# Patient Record
Sex: FEMALE | Race: BLACK OR AFRICAN AMERICAN | Employment: FULL TIME | URBAN - METROPOLITAN AREA
[De-identification: names, ages, dates, MRNs, and addresses within clinical notes are randomized per-mention and may not be internally consistent; named-entity substitution may affect disease eponyms.]

---

## 2018-10-18 ENCOUNTER — OFFICE VISIT (OUTPATIENT)
Dept: FAMILY MEDICINE CLINIC | Age: 58
End: 2018-10-18

## 2018-10-18 VITALS
BODY MASS INDEX: 26.51 KG/M2 | HEIGHT: 69 IN | HEART RATE: 75 BPM | RESPIRATION RATE: 20 BRPM | SYSTOLIC BLOOD PRESSURE: 127 MMHG | WEIGHT: 179 LBS | DIASTOLIC BLOOD PRESSURE: 73 MMHG | OXYGEN SATURATION: 99 % | TEMPERATURE: 97.4 F

## 2018-10-18 DIAGNOSIS — Z13.1 SCREENING FOR DIABETES MELLITUS (DM): ICD-10-CM

## 2018-10-18 DIAGNOSIS — Z00.00 ENCOUNTER FOR ANNUAL PHYSICAL EXAM: Primary | ICD-10-CM

## 2018-10-18 DIAGNOSIS — Z71.89 ADVANCE DIRECTIVE DISCUSSED WITH PATIENT: ICD-10-CM

## 2018-10-18 DIAGNOSIS — Z85.118 H/O: LUNG CANCER: ICD-10-CM

## 2018-10-18 DIAGNOSIS — Z12.31 BREAST CANCER SCREENING BY MAMMOGRAM: ICD-10-CM

## 2018-10-18 DIAGNOSIS — E55.9 VITAMIN D DEFICIENCY: ICD-10-CM

## 2018-10-18 DIAGNOSIS — M81.0 POSTMENOPAUSAL OSTEOPOROSIS: ICD-10-CM

## 2018-10-18 DIAGNOSIS — E78.5 DYSLIPIDEMIA (HIGH LDL; LOW HDL): ICD-10-CM

## 2018-10-18 DIAGNOSIS — Z11.59 NEED FOR HEPATITIS C SCREENING TEST: ICD-10-CM

## 2018-10-18 DIAGNOSIS — Z13.29 SCREENING FOR THYROID DISORDER: ICD-10-CM

## 2018-10-18 RX ORDER — LETROZOLE 2.5 MG/1
2.5 TABLET, FILM COATED ORAL DAILY
COMMUNITY

## 2018-10-18 NOTE — PATIENT INSTRUCTIONS

## 2018-10-18 NOTE — PROGRESS NOTES
Chief Complaint   Patient presents with    New Patient    Breast Problem     Subjective: Jorge Bal is a 62 y.o. female with h/o lung cancer presents to [facility] care for annual medical exam.   Her gyne and breast care is done elsewhere by her Ob-Gyne physician. Current Outpatient Medications   Medication Sig Dispense Refill    letrozole (FEMARA) 2.5 mg tablet Take 2.5 mg by mouth daily. No Known Allergies       Past Surgical History:   Procedure Laterality Date    HX MYOMECTOMY  2004    HX OTHER SURGICAL  2016    lung surgery      Family History   Problem Relation Age of Onset    Heart Disease Mother     Hypertension Mother      Social History     Tobacco Use    Smoking status: Never Smoker    Smokeless tobacco: Never Used   Substance Use Topics    Alcohol use: No     Frequency: Never      ROS:   Feeling generally well  No unusual headaches, no dysphagia  No prolonged cough. No dyspnea or chest pain on exertion  No abdominal pain, change in bowel habits, black or bloody stools  No urinary tract symptoms  No new or unusual musculoskeletal symptoms. Objective:  Blood pressure 127/73, pulse 75, temperature 97.4 °F (36.3 °C), resp. rate 20, height 5' 9\" (1.753 m), weight 179 lb (81.2 kg), SpO2 99 %. Patient appears well, alert, oriented x 3, in no distress. ENT normal.  Neck supple. No adenopathy or thyromegaly. PERRL. Lungs: clear, good air entry, no wheezes, rhonchi or rales  CVS: S1 and S2 normal, no murmurs, regular rate and rhythm  Abdomen soft without tenderness, guarding, mass or organomegaly  Ext: no edema, normal peripheral pulses  Neuro: normal, no focal findings    Breast and Pelvic exams are deferred.     Assessment/Plan:  Diagnoses and all orders for this visit:    Encounter for annual physical exam  -     CBC WITH AUTOMATED DIFF  -     METABOLIC PANEL, COMPREHENSIVE  -     URINALYSIS W/ RFLX MICROSCOPIC    Advance directive discussed with patient  -     ME ADVANCE CARE PLANNING DISCUSS DOCUMENTED IN MEDICAL RECORD    Dyslipidemia (high LDL; low HDL)  -     LIPID PANEL    Vitamin D deficiency  -     VITAMIN D, 25 HYDROXY    Screening for diabetes mellitus (DM)  -     HEMOGLOBIN A1C WITH EAG    Screening for thyroid disorder  -     TSH 3RD GENERATION    Postmenopausal osteoporosis  -     DEXA BONE DENSITY STUDY AXIAL; Future    Breast cancer screening by mammogram  -     [facility] MAMMO BI SCREENING INCL CAD; Future    H/O: lung cancer    Need for hepatitis C screening test  -     HEPATITIS C AB      Patient Instructions        Advance Directives: Care Instructions  Your Care Instructions  An advance directive is a legal way to state your wishes at the end of your life. It tells your family and your doctor what to do if you can no longer say what you want. There are two main types of advance directives. You can change them any time that your wishes change. · A living will tells your family and your doctor your wishes about life support and other treatment. · A durable power of  for health care lets you name a person to make treatment decisions for you when you can't speak for yourself. This person is called a health care agent. If you do not have an advance directive, decisions about your medical care may be made by a doctor or a  who doesn't know you. It may help to think of an advance directive as a gift to the people who care for you. If you have one, they won't have to make tough decisions by themselves. Follow-up care is a key part of your treatment and safety. Be sure to make and go to all appointments, and call your doctor if you are having problems. It's also a good idea to know your test results and keep a list of the medicines you take. How can you care for yourself at home? · Discuss your wishes with your loved ones and your doctor. This way, there are no surprises. · Many states have a unique form.  Or you might use a universal form that has been approved by many states. This kind of form can sometimes be completed and stored online. Your electronic copy will then be available wherever you have a connection to the Internet. In most cases, doctors will respect your wishes even if you have a form from a different state. · You don't need a  to do an advance directive. But you may want to get legal advice. · Think about these questions when you prepare an advance directive:  ? Who do you want to make decisions about your medical care if you are not able to? Many people choose a family member or close friend. ? Do you know enough about life support methods that might be used? If not, talk to your doctor so you understand. ? What are you most afraid of that might happen? You might be afraid of having pain, losing your independence, or being kept alive by machines. ? Where would you prefer to die? Choices include your home, a hospital, or a nursing home. ? Would you like to have information about hospice care to support you and your family? ? Do you want to donate organs when you die? ? Do you want certain [employer]ist practices performed before you die? If so, put your wishes in the advance directive. · Read your advance directive every year, and make changes as needed. When should you call for help? Be sure to contact your doctor if you have any questions. Where can you learn more? Go to http://kirti-jazmyn.info/. Enter R264 in the search box to learn more about \"Advance Directives: Care Instructions. \"  Current as of: April 19, 2018  Content Version: 11.8  © 3045-5580 Healthwise, Incorporated. Care instructions adapted under license by [employer] (which disclaims liability or warranty for this information). If you have questions about a medical condition or this instruction, always ask your healthcare professional. [address] any warranty or liability for your use of this information.         Follow-up Disposition:  Return 1-2 weeks , for follow up results.

## 2018-10-18 NOTE — LETTER
11/1/2018 10:09 AM 
 
Ms. Adrienne Perez [facility] [address] Dear Adrienne ePrez: Please find your most recent results below. Tot chol and LDL are trending, vit D is low normal, A1C ist a borderline diabetes level. Kidney and liver are normal, thhyroid function is normal 
 
Resulted Orders CBC WITH AUTOMATED DIFF Result Value Ref Range WBC 3.9 3.4 - 10.8 x10E3/uL  
 RBC 4.78 3.77 - 5.28 x10E6/uL HGB 12.5 11.1 - 15.9 g/dL HCT 38.9 34.0 - 46.6 % MCV 81 79 - 97 fL  
 MCH 26.2 (L) 26.6 - 33.0 pg  
 MCHC 32.1 31.5 - 35.7 g/dL  
 RDW 14.3 12.3 - 15.4 % PLATELET 684 543 - 740 x10E3/uL NEUTROPHILS 66 Not Estab. % Lymphocytes 19 Not Estab. % MONOCYTES 10 Not Estab. % EOSINOPHILS 4 Not Estab. % BASOPHILS 1 Not Estab. %  
 ABS. NEUTROPHILS 2.6 1.4 - 7.0 x10E3/uL Abs Lymphocytes 0.7 0.7 - 3.1 x10E3/uL  
 ABS. MONOCYTES 0.4 0.1 - 0.9 x10E3/uL  
 ABS. EOSINOPHILS 0.2 0.0 - 0.4 x10E3/uL  
 ABS. BASOPHILS 0.0 0.0 - 0.2 x10E3/uL IMMATURE GRANULOCYTES 0 Not Estab. %  
 ABS. IMM. GRANS. 0.0 0.0 - 0.1 x10E3/uL Narrative Performed at:  [address]  [id number] : Elaine Gresham MD, Phone:  [phone] METABOLIC PANEL, COMPREHENSIVE Result Value Ref Range Glucose 96 65 - 99 mg/dL BUN 11 6 - 24 mg/dL Creatinine 0.93 0.57 - 1.00 mg/dL GFR est non-AA 68 >59 mL/min/1.73 GFR est AA 79 >59 mL/min/1.73  
 BUN/Creatinine ratio 12 9 - 23 Sodium 142 134 - 144 mmol/L Potassium 4.6 3.5 - 5.2 mmol/L Chloride 103 96 - 106 mmol/L  
 CO2 23 20 - 29 mmol/L Calcium 10.2 8.7 - 10.2 mg/dL Protein, total 7.5 6.0 - 8.5 g/dL Albumin 4.6 3.5 - 5.5 g/dL GLOBULIN, TOTAL 2.9 1.5 - 4.5 g/dL A-G Ratio 1.6 1.2 - 2.2 Bilirubin, total 0.3 0.0 - 1.2 mg/dL Alk. phosphatase 88 39 - 117 IU/L  
 AST (SGOT) 23 0 - 40 IU/L  
 ALT (SGPT) 20 0 - 32 IU/L Narrative Performed at:  [address]  [id number] : Ila Tejeda MD, Phone:  [phone] TSH 3RD GENERATION Result Value Ref Range TSH 1.670 0.450 - 4.500 uIU/mL Narrative Performed at:  [address]  [id number] : Ila Tejeda MD, Phone:  [phone] URINALYSIS W/ RFLX MICROSCOPIC Result Value Ref Range Specific Gravity 1.024 1.005 - 1.030  
 pH (UA) 6.0 5.0 - 7.5 Color Yellow Yellow Appearance Clear Clear Leukocyte Esterase Negative Negative Protein Negative Negative/Trace Glucose Negative Negative Ketone Negative Negative Blood Negative Negative Bilirubin Negative Negative Urobilinogen 0.2 0.2 - 1.0 mg/dL Nitrites Negative Negative Microscopic Examination Comment Comment:  
   Microscopic not indicated and not performed. Narrative Performed at:  [address]  [id number] : Ila Tejeda MD, Phone:  [phone] VITAMIN D, 25 HYDROXY Result Value Ref Range VITAMIN D, 25-HYDROXY 34.4 30.0 - 100.0 ng/mL Comment:  
   Vitamin D deficiency has been defined by the [address] [place] [address] practice guideline as a 
level of serum 25-OH vitamin D less than 20 ng/mL (1,2). The Endocrine Society went on to further define vitamin D 
insufficiency as a level between 21 and 29 ng/mL (2). 1. IOM ([place] of Medicine). 2010. Dietary reference 
   intakes for calcium and D. [facility]: The 
   [employer]formerly [employer]). 2. Abigail MF, Susi NC, Alex WONG, et al. 
   Evaluation, treatment, and prevention of vitamin D 
   deficiency: an Endocrine Society clinical practice 
   guideline. JCEM. 2011 Jul; 96(7):1911-30. Narrative Performed at:  [address]  [id number] : Myrtle Arambula MD, Phone:  [phone] LIPID PANEL Result Value Ref Range Cholesterol, total 219 (H) 100 - 199 mg/dL Triglyceride 137 0 - 149 mg/dL HDL Cholesterol 44 >39 mg/dL VLDL, calculated 27 5 - 40 mg/dL LDL, calculated 148 (H) 0 - 99 mg/dL Narrative Performed at:  [address]  [id number] : Myrtle Arambula MD, Phone:  [phone] HEMOGLOBIN A1C WITH EAG Result Value Ref Range Hemoglobin A1c 6.1 (H) 4.8 - 5.6 % Comment:  
            Prediabetes: 5.7 - 6.4 Diabetes: >6.4 Glycemic control for adults with diabetes: <7.0 Estimated average glucose 128 mg/dL Narrative Performed at:  [address]  [id number] : Myrtle Arambula MD, Phone:  [phone] HEPATITIS C AB Result Value Ref Range Hep C Virus Ab <0.1 0.0 - 0.9 s/co ratio Comment:  
                                     Negative:     < 0.8 Indeterminate: 0.8 - 0.9 Positive:     > 0.9 The CDC recommends that a positive HCV antibody result 
 be followed up with a HCV Nucleic Acid Amplification 
 test ([id number]). Narrative Performed at:  [address]  [id number] : Myrtle Arambula MD, Phone:  [phone] RECOMMENDATIONS: 
Work on diet and exercise. Make sure you are taking 500mg of Calcium with Vitamin D twice a day. Please call me if you have any questions: [phone] Sincerely, Ricardo Kenny MD

## 2018-10-23 LAB
25(OH)D3+25(OH)D2 SERPL-MCNC: 34.4 NG/ML (ref 30–100)
ALBUMIN SERPL-MCNC: 4.6 G/DL (ref 3.5–5.5)
ALBUMIN/GLOB SERPL: 1.6 {RATIO} (ref 1.2–2.2)
ALP SERPL-CCNC: 88 IU/L (ref 39–117)
ALT SERPL-CCNC: 20 IU/L (ref 0–32)
APPEARANCE UR: CLEAR
AST SERPL-CCNC: 23 IU/L (ref 0–40)
BASOPHILS # BLD AUTO: 0 X10E3/UL (ref 0–0.2)
BASOPHILS NFR BLD AUTO: 1 %
BILIRUB SERPL-MCNC: 0.3 MG/DL (ref 0–1.2)
BILIRUB UR QL STRIP: NEGATIVE
BUN SERPL-MCNC: 11 MG/DL (ref 6–24)
BUN/CREAT SERPL: 12 (ref 9–23)
CALCIUM SERPL-MCNC: 10.2 MG/DL (ref 8.7–10.2)
CHLORIDE SERPL-SCNC: 103 MMOL/L (ref 96–106)
CHOLEST SERPL-MCNC: 219 MG/DL (ref 100–199)
CO2 SERPL-SCNC: 23 MMOL/L (ref 20–29)
COLOR UR: YELLOW
CREAT SERPL-MCNC: 0.93 MG/DL (ref 0.57–1)
EOSINOPHIL # BLD AUTO: 0.2 X10E3/UL (ref 0–0.4)
EOSINOPHIL NFR BLD AUTO: 4 %
ERYTHROCYTE [DISTWIDTH] IN BLOOD BY AUTOMATED COUNT: 14.3 % (ref 12.3–15.4)
EST. AVERAGE GLUCOSE BLD GHB EST-MCNC: 128 MG/DL
GLOBULIN SER CALC-MCNC: 2.9 G/DL (ref 1.5–4.5)
GLUCOSE SERPL-MCNC: 96 MG/DL (ref 65–99)
GLUCOSE UR QL: NEGATIVE
HBA1C MFR BLD: 6.1 % (ref 4.8–5.6)
HCT VFR BLD AUTO: 38.9 % (ref 34–46.6)
HCV AB S/CO SERPL IA: <0.1 S/CO RATIO (ref 0–0.9)
HDLC SERPL-MCNC: 44 MG/DL
HGB BLD-MCNC: 12.5 G/DL (ref 11.1–15.9)
HGB UR QL STRIP: NEGATIVE
IMM GRANULOCYTES # BLD: 0 X10E3/UL (ref 0–0.1)
IMM GRANULOCYTES NFR BLD: 0 %
KETONES UR QL STRIP: NEGATIVE
LDLC SERPL CALC-MCNC: 148 MG/DL (ref 0–99)
LEUKOCYTE ESTERASE UR QL STRIP: NEGATIVE
LYMPHOCYTES # BLD AUTO: 0.7 X10E3/UL (ref 0.7–3.1)
LYMPHOCYTES NFR BLD AUTO: 19 %
MCH RBC QN AUTO: 26.2 PG (ref 26.6–33)
MCHC RBC AUTO-ENTMCNC: 32.1 G/DL (ref 31.5–35.7)
MCV RBC AUTO: 81 FL (ref 79–97)
MICRO URNS: NORMAL
MONOCYTES # BLD AUTO: 0.4 X10E3/UL (ref 0.1–0.9)
MONOCYTES NFR BLD AUTO: 10 %
NEUTROPHILS # BLD AUTO: 2.6 X10E3/UL (ref 1.4–7)
NEUTROPHILS NFR BLD AUTO: 66 %
NITRITE UR QL STRIP: NEGATIVE
PH UR STRIP: 6 [PH] (ref 5–7.5)
PLATELET # BLD AUTO: 235 X10E3/UL (ref 150–379)
POTASSIUM SERPL-SCNC: 4.6 MMOL/L (ref 3.5–5.2)
PROT SERPL-MCNC: 7.5 G/DL (ref 6–8.5)
PROT UR QL STRIP: NEGATIVE
RBC # BLD AUTO: 4.78 X10E6/UL (ref 3.77–5.28)
SODIUM SERPL-SCNC: 142 MMOL/L (ref 134–144)
SP GR UR: 1.02 (ref 1–1.03)
TRIGL SERPL-MCNC: 137 MG/DL (ref 0–149)
TSH SERPL DL<=0.005 MIU/L-ACNC: 1.67 UIU/ML (ref 0.45–4.5)
UROBILINOGEN UR STRIP-MCNC: 0.2 MG/DL (ref 0.2–1)
VLDLC SERPL CALC-MCNC: 27 MG/DL (ref 5–40)
WBC # BLD AUTO: 3.9 X10E3/UL (ref 3.4–10.8)

## 2018-11-01 ENCOUNTER — OFFICE VISIT (OUTPATIENT)
Dept: FAMILY MEDICINE CLINIC | Age: 58
End: 2018-11-01

## 2018-11-01 VITALS
BODY MASS INDEX: 26.51 KG/M2 | HEIGHT: 69 IN | HEART RATE: 80 BPM | DIASTOLIC BLOOD PRESSURE: 78 MMHG | WEIGHT: 179 LBS | SYSTOLIC BLOOD PRESSURE: 114 MMHG | TEMPERATURE: 98.3 F | OXYGEN SATURATION: 98 % | RESPIRATION RATE: 20 BRPM

## 2018-11-01 DIAGNOSIS — J20.9 ACUTE BRONCHITIS DUE TO INFECTION: Primary | ICD-10-CM

## 2018-11-01 DIAGNOSIS — Z12.11 COLON CANCER SCREENING: ICD-10-CM

## 2018-11-01 DIAGNOSIS — E78.5 DYSLIPIDEMIA (HIGH LDL; LOW HDL): ICD-10-CM

## 2018-11-01 DIAGNOSIS — R73.03 BORDERLINE DIABETES MELLITUS: ICD-10-CM

## 2018-11-01 DIAGNOSIS — E55.9 HYPOVITAMINOSIS D: ICD-10-CM

## 2018-11-01 RX ORDER — AZITHROMYCIN 250 MG/1
TABLET, FILM COATED ORAL
Qty: 6 TAB | Refills: 0 | Status: SHIPPED | OUTPATIENT
Start: 2018-11-01 | End: 2018-11-06

## 2018-11-01 RX ORDER — PROMETHAZINE HYDROCHLORIDE AND DEXTROMETHORPHAN HYDROBROMIDE 6.25; 15 MG/5ML; MG/5ML
5 SYRUP ORAL
Qty: 118 ML | Refills: 0 | Status: SHIPPED | OUTPATIENT
Start: 2018-11-01 | End: 2018-11-08

## 2018-11-01 RX ORDER — CHOLECALCIFEROL TAB 125 MCG (5000 UNIT) 125 MCG
5000 TAB ORAL DAILY
Qty: 90 TAB | Refills: 1 | Status: SHIPPED | OUTPATIENT
Start: 2018-11-01

## 2018-11-01 NOTE — PATIENT INSTRUCTIONS
Learning About Vitamin D  Why is it important to get enough vitamin D? Your body needs vitamin D to absorb calcium. Calcium keeps your bones and muscles, including your heart, healthy and strong. If your muscles don't get enough calcium, they can cramp, hurt, or feel weak. You may have long-term (chronic) muscle aches and pains. If you don't get enough vitamin D throughout life, you have an increased chance of having thin and brittle bones (osteoporosis) in your later years. Children who don't get enough vitamin D may not grow as much as others their age. They also have a chance of getting a rare disease called rickets. It causes weak bones. Vitamin D and calcium are added to many foods. And your body uses sunshine to make its own vitamin D. How much vitamin D do you need? The [place] of Medicine recommends that people ages 3 through 79 get 600 IU (international units) every day. Adults 71 and older need 800 IU every day. Blood tests for vitamin D can check your vitamin D level. But there is no standard normal range used by all laboratories. You're likely getting enough vitamin D if your levels are in the range of 20 to 50 ng/mL. How can you get more vitamin D? Foods that contain vitamin D include:  · [place], tuna, and mackerel. These are some of the best foods to eat when you need to get more vitamin D.  · Cheese, egg yolks, and beef liver. These foods have vitamin D in small amounts. · Milk, soy drinks, orange juice, yogurt, margarine, and some kinds of cereal have vitamin D added to them. Some people don't make vitamin D as well as others. They may have to take extra care in getting enough vitamin D. Things that reduce how much vitamin D your body makes include:  · Dark skin, such as many  Americans have. · Age, especially if you are older than 72. · Digestive problems, such as Crohn's or celiac disease. · Liver and kidney disease.   Some people who do not get enough vitamin D may need supplements. Are there any risks from taking vitamin D?  · Too much vitamin D:  ? Can damage your kidneys. ? Can cause nausea and vomiting, constipation, and weakness. ? Raises the amount of calcium in your blood. If this happens, you can get confused or have an irregular heart rhythm. · Vitamin D may interact with other medicines. Tell your doctor about all of the medicines you take, including over-the-counter drugs, herbs, and pills. Tell your doctor about all of your current medical problems. Where can you learn more? Go to http://kirti-jazmyn.info/. Enter [id number] in the search box to learn more about \"Learning About Vitamin D.\"  Current as of: March 29, 2018  Content Version: 11.8  © 0545-2072 Healthwise, Incorporated. Care instructions adapted under license by [employer] (which disclaims liability or warranty for this information). If you have questions about a medical condition or this instruction, always ask your healthcare professional. [place] [address] any warranty or liability for your use of this information.

## 2018-11-05 ENCOUNTER — HOSPITAL ENCOUNTER (OUTPATIENT)
Dept: MAMMOGRAPHY | Age: 58
Discharge: HOME | End: 2018-11-05
Attending: INTERNAL MEDICINE
Payer: COMMERCIAL

## 2018-11-05 DIAGNOSIS — Z12.31 BREAST CANCER SCREENING BY MAMMOGRAM: ICD-10-CM

## 2018-11-05 DIAGNOSIS — M81.0 POSTMENOPAUSAL OSTEOPOROSIS: ICD-10-CM

## 2018-11-05 PROCEDURE — 77067 SCR MAMMO BI INCL CAD: CPT

## 2018-11-05 PROCEDURE — 77080 DXA BONE DENSITY AXIAL: CPT

## 2018-11-05 NOTE — TELEPHONE ENCOUNTER
----- Message from Julius Pitts sent at 11/5/2018  1:40 PM EST -----  Regarding: Dr Hauser/telephone  Pt would like to speak to the doctor, regarding her medications, doctor forgot the directions, please call pt at [phone]. Pt would like call back today.

## 2018-11-12 ENCOUNTER — HOSPITAL ENCOUNTER (OUTPATIENT)
Dept: MAMMOGRAPHY | Age: 58
Discharge: HOME | End: 2018-11-12
Attending: INTERNAL MEDICINE
Payer: COMMERCIAL

## 2018-11-12 DIAGNOSIS — R92.8 ABNORMAL MAMMOGRAM: ICD-10-CM

## 2018-11-12 PROCEDURE — 76642 ULTRASOUND BREAST LIMITED: CPT

## 2018-11-12 PROCEDURE — 77065 DX MAMMO INCL CAD UNI: CPT

## 2018-12-28 ENCOUNTER — OFFICE VISIT (OUTPATIENT)
Dept: FAMILY MEDICINE CLINIC | Age: 58
End: 2018-12-28

## 2018-12-28 VITALS
WEIGHT: 174.2 LBS | BODY MASS INDEX: 25.8 KG/M2 | TEMPERATURE: 98.5 F | SYSTOLIC BLOOD PRESSURE: 122 MMHG | DIASTOLIC BLOOD PRESSURE: 87 MMHG | OXYGEN SATURATION: 97 % | HEIGHT: 69 IN | RESPIRATION RATE: 20 BRPM | HEART RATE: 105 BPM

## 2018-12-28 DIAGNOSIS — J45.909 ACUTE BRONCHITIS WITH ASTHMA: Primary | ICD-10-CM

## 2018-12-28 DIAGNOSIS — J20.9 ACUTE BRONCHITIS WITH ASTHMA: Primary | ICD-10-CM

## 2018-12-28 RX ORDER — IPRATROPIUM BROMIDE 0.5 MG/2.5ML
0.5 SOLUTION RESPIRATORY (INHALATION)
Qty: 2.5 ML | Refills: 0
Start: 2018-12-28 | End: 2018-12-28

## 2018-12-28 RX ORDER — ALBUTEROL SULFATE 90 UG/1
2 AEROSOL, METERED RESPIRATORY (INHALATION)
Qty: 1 INHALER | Refills: 1 | Status: SHIPPED | OUTPATIENT
Start: 2018-12-28

## 2018-12-28 RX ORDER — AZITHROMYCIN 250 MG/1
TABLET, FILM COATED ORAL
Qty: 6 TAB | Refills: 0 | Status: SHIPPED | OUTPATIENT
Start: 2018-12-28 | End: 2019-01-02

## 2018-12-28 RX ORDER — BENZONATATE 100 MG/1
100 CAPSULE ORAL
Qty: 21 CAP | Refills: 0 | Status: SHIPPED | OUTPATIENT
Start: 2018-12-28 | End: 2019-01-04

## 2018-12-28 NOTE — PROGRESS NOTES
Chief Complaint Patient presents with  Cold Symptoms  
  cough and congestion since Sunday. Patient is also wheezing 1. Have you been to the ER, urgent care clinic since your last visit? Hospitalized since your last visit?no 2. Have you seen or consulted any other health care providers outside of the [address] [place] [address] since your last visit? Include any pap smears or colon screening. No 
 
Patient having cough and congestion since Sunday. Patient also is wheezing.

## 2018-12-28 NOTE — PATIENT INSTRUCTIONS
Bronchitis: Care Instructions Your Care Instructions Bronchitis is inflammation of the bronchial tubes, which carry air to the lungs. The tubes swell and produce mucus, or phlegm. The mucus and inflamed bronchial tubes make you cough. You may have trouble breathing. Most cases of bronchitis are caused by viruses like those that cause colds. Antibiotics usually do not help and they may be harmful. Bronchitis usually develops rapidly and lasts about 2 to 3 weeks in otherwise healthy people. Follow-up care is a key part of your treatment and safety. Be sure to make and go to all appointments, and call your doctor if you are having problems. It's also a good idea to know your test results and keep a list of the medicines you take. How can you care for yourself at home? · Take all medicines exactly as prescribed. Call your doctor if you think you are having a problem with your medicine. · Get some extra rest. 
· Take an over-the-counter pain medicine, such as acetaminophen (Tylenol), ibuprofen (Advil, Motrin), or naproxen (Aleve) to reduce fever and relieve body aches. Read and follow all instructions on the label. · Do not take two or more pain medicines at the same time unless the doctor told you to. Many pain medicines have acetaminophen, which is Tylenol. Too much acetaminophen (Tylenol) can be harmful. · Take an over-the-counter cough medicine that contains dextromethorphan to help quiet a dry, hacking cough so that you can sleep. Avoid cough medicines that have more than one active ingredient. Read and follow all instructions on the label. · Breathe moist air from a humidifier, hot shower, or sink filled with hot water. The heat and moisture will thin mucus so you can cough it out. · Do not smoke. Smoking can make bronchitis worse. If you need help quitting, talk to your doctor about stop-smoking programs and medicines. These can increase your chances of quitting for good. When should you call for help? Call 911 anytime you think you may need emergency care. For example, call if: 
  · You have severe trouble breathing.  
 Call your doctor now or seek immediate medical care if: 
  · You have new or worse trouble breathing.  
  · You cough up dark brown or bloody mucus (sputum).  
  · You have a new or higher fever.  
  · You have a new rash.  
 Watch closely for changes in your health, and be sure to contact your doctor if: 
  · You cough more deeply or more often, especially if you notice more mucus or a change in the color of your mucus.  
  · You are not getting better as expected. Where can you learn more? Go to http://kirti-jazmyn.info/. Enter H333 in the search box to learn more about \"Bronchitis: Care Instructions. \" Current as of: December 6, 2017 Content Version: 11.8 © 6322-0205 Healthwise, [employer]. Care instructions adapted under license by [employer] (which disclaims liability or warranty for this information). If you have questions about a medical condition or this instruction, always ask your healthcare professional. [address] any warranty or liability for your use of this information.

## 2018-12-28 NOTE — PROGRESS NOTES
Chief Complaint Patient presents with  Cold Symptoms  
  cough and congestion since Sunday. Patient is also wheezing SUBJECTIVE:  
Tobi Blankenship is a 62 y.o. AA female who complains of chest congestion, cough described as productive of green sputum, wheezing for 7 days. She denies a history of fevers and shortness of breath. She does not have history of asthma. Patient does not smoke cigarettes. Positive h/o exposure. She has been self treating with otc medications with no improvement. OBJECTIVE: 
Blood pressure 122/87, pulse (!) 105, temperature 98.5 °F (36.9 °C), temperature source Oral, resp. rate 20, height 5' 9\" (1.753 m), weight 174 lb 3.2 oz (79 kg), SpO2 97 %. Vitals as noted above. Appearance: alert, well appearing, and in no distress and oriented to person, place, and time. ENT- bilateral TM fluid noted, neck has bilateral anterior cervical nodes enlarged and pharynx erythematous without exudate. Chest - bronchial breath sound, +ve wheezes, No rales or rhonchi ASSESSMENT/PLAN: 
Diagnoses and all orders for this visit: 
 
Acute bronchitis with asthma -     IPRATROPIUM BROMIDE NON-COMP 
-     ipratropium (ATROVENT) 0.02 % soln; 2.5 mL by Nebulization route now for 1 dose., No Print, Disp-2.5 mL, R-0 
-     UT PRESSURIZED/NONPRESSURIZED INHALATION TREATMENT 
-     azithromycin (ZITHROMAX) 250 mg tablet; use as directed, Normal, Disp-6 Tab, R-0 
-     benzonatate (TESSALON PERLES) 100 mg capsule; Take 1 Cap by mouth three (3) times daily as needed for Cough for up to 7 days. , Normal, Disp-21 Cap, R-0 
-     albuterol (PROVENTIL HFA, VENTOLIN HFA, PROAIR HFA) 90 mcg/actuation inhaler; Take 2 Puffs by inhalation every four (4) hours as needed for Wheezing., Normal, Disp-1 Inhaler, R-1 Patient Instructions Bronchitis: Care Instructions Your Care Instructions Bronchitis is inflammation of the bronchial tubes, which carry air to the lungs. The tubes swell and produce mucus, or phlegm. The mucus and inflamed bronchial tubes make you cough. You may have trouble breathing. Most cases of bronchitis are caused by viruses like those that cause colds. Antibiotics usually do not help and they may be harmful. Bronchitis usually develops rapidly and lasts about 2 to 3 weeks in otherwise healthy people. Follow-up care is a key part of your treatment and safety. Be sure to make and go to all appointments, and call your doctor if you are having problems. It's also a good idea to know your test results and keep a list of the medicines you take. How can you care for yourself at home? · Take all medicines exactly as prescribed. Call your doctor if you think you are having a problem with your medicine. · Get some extra rest. 
· Take an over-the-counter pain medicine, such as acetaminophen (Tylenol), ibuprofen (Advil, Motrin), or naproxen (Aleve) to reduce fever and relieve body aches. Read and follow all instructions on the label. · Do not take two or more pain medicines at the same time unless the doctor told you to. Many pain medicines have acetaminophen, which is Tylenol. Too much acetaminophen (Tylenol) can be harmful. · Take an over-the-counter cough medicine that contains dextromethorphan to help quiet a dry, hacking cough so that you can sleep. Avoid cough medicines that have more than one active ingredient. Read and follow all instructions on the label. · Breathe moist air from a humidifier, hot shower, or sink filled with hot water. The heat and moisture will thin mucus so you can cough it out. · Do not smoke. Smoking can make bronchitis worse. If you need help quitting, talk to your doctor about stop-smoking programs and medicines. These can increase your chances of quitting for good. When should you call for help? Call 911 anytime you think you may need emergency care. For example, call if: 
  · You have severe trouble breathing.  Call your doctor now or seek immediate medical care if: 
  · You have new or worse trouble breathing.  
  · You cough up dark brown or bloody mucus (sputum).  
  · You have a new or higher fever.  
  · You have a new rash.  
 Watch closely for changes in your health, and be sure to contact your doctor if: 
  · You cough more deeply or more often, especially if you notice more mucus or a change in the color of your mucus.  
  · You are not getting better as expected. Where can you learn more? Go to http://kirti-jazmyn.info/. Enter H333 in the search box to learn more about \"Bronchitis: Care Instructions. \" Current as of: December 6, 2017 Content Version: 11.8 © 0880-7683 [employer]. Care instructions adapted under license by [employer] (which disclaims liability or warranty for this information). If you have questions about a medical condition or this instruction, always ask your healthcare professional. [place] [address] any warranty or liability for your use of this information. Follow-up Disposition: 
Return if symptoms worsen or fail to improve, for f/u treatment.

## 2018-12-28 NOTE — LETTER
NOTIFICATION RETURN TO WORK  
 
12/28/2018 10:35 AM 
 
Ms. Rebekah Romero [facility] [address] To Whom It May Concern: Rebekah Romero is currently under the care of Kristen Epps. She will return to work January 2,2019. If there are questions or concerns please have the patient contact our office. Sincerely, Henry Yang MD

## 2020-04-07 ENCOUNTER — TELEPHONE (OUTPATIENT)
Dept: FAMILY MEDICINE CLINIC | Age: 60
End: 2020-04-07

## 2020-04-07 NOTE — TELEPHONE ENCOUNTER
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      Callback required           Best contact number(s):  [phone]  or  [address] [facility]

## 2020-04-07 NOTE — TELEPHONE ENCOUNTER
Return call and spoke with Daughter Yuval Gay . On 2/1/2020 patient went to [facility] for Vertigo . On 2/23/2020 patient stared vomiting / unsteady and went to [facility] . They did MRI found a tumor on the back of head . Surgery on  3/2/2020.  set an appt on 4/8/2020

## 2020-04-08 ENCOUNTER — VIRTUAL VISIT (OUTPATIENT)
Dept: FAMILY MEDICINE CLINIC | Age: 60
End: 2020-04-08

## 2020-04-08 VITALS
BODY MASS INDEX: 22.96 KG/M2 | WEIGHT: 155 LBS | HEIGHT: 69 IN | DIASTOLIC BLOOD PRESSURE: 70 MMHG | TEMPERATURE: 98 F | SYSTOLIC BLOOD PRESSURE: 125 MMHG

## 2020-04-08 DIAGNOSIS — E55.9 VITAMIN D DEFICIENCY: ICD-10-CM

## 2020-04-08 DIAGNOSIS — E78.5 DYSLIPIDEMIA (HIGH LDL; LOW HDL): ICD-10-CM

## 2020-04-08 DIAGNOSIS — R35.0 FREQUENCY OF URINATION: ICD-10-CM

## 2020-04-08 DIAGNOSIS — C79.31 METASTATIC CANCER TO BRAIN (HCC): Primary | ICD-10-CM

## 2020-04-08 DIAGNOSIS — Z13.29 SCREENING FOR THYROID DISORDER: ICD-10-CM

## 2020-04-08 DIAGNOSIS — R73.03 BORDERLINE DIABETES MELLITUS: ICD-10-CM

## 2020-04-08 NOTE — PROGRESS NOTES
Chief Complaint   Patient presents with    Follow-up     last vist 12/30/2018     HPI:  Gali Tran is a 61 y.o. AA female with h/o Lung cancer evaluated via telephone on 4/8/2020. Ms. Dayna Martinez has not been seen in clinic since 12/30/2018. 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 On 2/1/2020 Ms. Dayna Martinez was seen at [facility] for dizziness. On 2/23/2020 patient started vomiting and developed unsteady balance. She was taken to [address] [place] [address] where an MRI showed a tumor on the back the brain, also on liver and pancrease. Patient underwent surgery on 3/2/2020 with removal of tumor from brain, liver and pancrease. She was discharged home with physical therapy twice a week and a nurse visit once a week. Now she follows at [facility] with Dr. Te Blackwell. She will be starting radiation therapy followed chemotherapy soon. Details of this discussion including any medical advice provided:   I have asked patient and daughter to have [facility] fax us the related medical record. Considering I have not seen patient in over a year, I will be sending lab orders to be completed. I affirm this is a Patient Initiated Episode with an Established Patient who has not had a related appointment within my department in the past 7 days or scheduled within the next 24 hours.     Total Time: minutes: 21-30 minutes    Note: not billable if this call serves to triage the patient into an appointment for the relevant concern      Saulo Teague MD

## 2020-04-08 NOTE — PROGRESS NOTES
Chief Complaint   Patient presents with    Follow-up     last vist 2019               []Hide copied text    []Sharver for details  Return call and spoke with Daughter Ric Rice . On 2/1/2020 patient went to [facility] for Vertigo . On 2/23/2020 patient stared vomiting / unsteady and went to [facility] . They did MRI found a tumor on the back of head . Surgery on  3/2/2020.  set an appt on 4/8/2020      Electronically signed by Raffi Chen LPN at 21/28/23 1918   Note Details

## 2020-04-21 LAB
25(OH)D3+25(OH)D2 SERPL-MCNC: 36.2 NG/ML (ref 30–100)
ALBUMIN SERPL-MCNC: 4.6 G/DL (ref 3.8–4.9)
ALBUMIN/GLOB SERPL: 1.6 {RATIO} (ref 1.2–2.2)
ALP SERPL-CCNC: 121 IU/L (ref 39–117)
ALT SERPL-CCNC: 49 IU/L (ref 0–32)
APPEARANCE UR: CLEAR
AST SERPL-CCNC: 43 IU/L (ref 0–40)
BACTERIA #/AREA URNS HPF: ABNORMAL /[HPF]
BASOPHILS # BLD AUTO: 0 X10E3/UL (ref 0–0.2)
BASOPHILS NFR BLD AUTO: 1 %
BILIRUB SERPL-MCNC: 0.3 MG/DL (ref 0–1.2)
BILIRUB UR QL STRIP: NEGATIVE
BUN SERPL-MCNC: 11 MG/DL (ref 6–24)
BUN/CREAT SERPL: 15 (ref 9–23)
CALCIUM SERPL-MCNC: 10.7 MG/DL (ref 8.7–10.2)
CASTS URNS QL MICRO: ABNORMAL /LPF
CHLORIDE SERPL-SCNC: 101 MMOL/L (ref 96–106)
CHOLEST SERPL-MCNC: 226 MG/DL (ref 100–199)
CO2 SERPL-SCNC: 25 MMOL/L (ref 20–29)
COLOR UR: YELLOW
CREAT SERPL-MCNC: 0.71 MG/DL (ref 0.57–1)
CRYSTALS URNS MICRO: ABNORMAL
EOSINOPHIL # BLD AUTO: 0.2 X10E3/UL (ref 0–0.4)
EOSINOPHIL NFR BLD AUTO: 3 %
EPI CELLS #/AREA URNS HPF: ABNORMAL /HPF (ref 0–10)
ERYTHROCYTE [DISTWIDTH] IN BLOOD BY AUTOMATED COUNT: 15.7 % (ref 11.7–15.4)
EST. AVERAGE GLUCOSE BLD GHB EST-MCNC: 140 MG/DL
GLOBULIN SER CALC-MCNC: 2.9 G/DL (ref 1.5–4.5)
GLUCOSE SERPL-MCNC: 135 MG/DL (ref 65–99)
GLUCOSE UR QL: NEGATIVE
HBA1C MFR BLD: 6.5 % (ref 4.8–5.6)
HCT VFR BLD AUTO: 41.6 % (ref 34–46.6)
HDLC SERPL-MCNC: 42 MG/DL
HGB BLD-MCNC: 12.9 G/DL (ref 11.1–15.9)
HGB UR QL STRIP: NEGATIVE
IMM GRANULOCYTES # BLD AUTO: 0 X10E3/UL (ref 0–0.1)
IMM GRANULOCYTES NFR BLD AUTO: 0 %
KETONES UR QL STRIP: NEGATIVE
LDLC SERPL CALC-MCNC: 141 MG/DL (ref 0–99)
LEUKOCYTE ESTERASE UR QL STRIP: ABNORMAL
LYMPHOCYTES # BLD AUTO: 2.3 X10E3/UL (ref 0.7–3.1)
LYMPHOCYTES NFR BLD AUTO: 37 %
MCH RBC QN AUTO: 26.2 PG (ref 26.6–33)
MCHC RBC AUTO-ENTMCNC: 31 G/DL (ref 31.5–35.7)
MCV RBC AUTO: 85 FL (ref 79–97)
MICRO URNS: ABNORMAL
MONOCYTES # BLD AUTO: 0.5 X10E3/UL (ref 0.1–0.9)
MONOCYTES NFR BLD AUTO: 8 %
MUCOUS THREADS URNS QL MICRO: PRESENT
NEUTROPHILS # BLD AUTO: 3.1 X10E3/UL (ref 1.4–7)
NEUTROPHILS NFR BLD AUTO: 51 %
NITRITE UR QL STRIP: NEGATIVE
PH UR STRIP: 5 [PH] (ref 5–7.5)
PLATELET # BLD AUTO: 256 X10E3/UL (ref 150–450)
POTASSIUM SERPL-SCNC: 4.4 MMOL/L (ref 3.5–5.2)
PROT SERPL-MCNC: 7.5 G/DL (ref 6–8.5)
PROT UR QL STRIP: ABNORMAL
RBC # BLD AUTO: 4.92 X10E6/UL (ref 3.77–5.28)
RBC #/AREA URNS HPF: ABNORMAL /HPF (ref 0–2)
SODIUM SERPL-SCNC: 145 MMOL/L (ref 134–144)
SP GR UR: 1.03 (ref 1–1.03)
TRIGL SERPL-MCNC: 214 MG/DL (ref 0–149)
TSH SERPL DL<=0.005 MIU/L-ACNC: 3.11 UIU/ML (ref 0.45–4.5)
UNIDENT CRYS URNS QL MICRO: PRESENT
UROBILINOGEN UR STRIP-MCNC: 0.2 MG/DL (ref 0.2–1)
VLDLC SERPL CALC-MCNC: 43 MG/DL (ref 5–40)
WBC # BLD AUTO: 6.1 X10E3/UL (ref 3.4–10.8)
WBC #/AREA URNS HPF: ABNORMAL /HPF (ref 0–5)

## 2020-04-22 DIAGNOSIS — E78.2 MIXED HYPERCHOLESTEROLEMIA AND HYPERTRIGLYCERIDEMIA: ICD-10-CM

## 2020-04-22 DIAGNOSIS — N30.00 ACUTE CYSTITIS WITHOUT HEMATURIA: Primary | ICD-10-CM

## 2020-04-22 RX ORDER — CIPROFLOXACIN 500 MG/1
500 TABLET ORAL 2 TIMES DAILY
Qty: 10 TAB | Refills: 0 | Status: SHIPPED | OUTPATIENT
Start: 2020-04-22 | End: 2020-04-26 | Stop reason: ALTCHOICE

## 2020-04-22 RX ORDER — ATORVASTATIN CALCIUM 20 MG/1
20 TABLET, FILM COATED ORAL DAILY
Qty: 30 TAB | Refills: 3 | Status: SHIPPED | OUTPATIENT
Start: 2020-04-22

## 2020-04-22 NOTE — PROGRESS NOTES
Glucose and A1C are at diabetes level,  Serum calcium is elevated, liver function test are up. Urine shows 1+ leuk, 1+ protein, few bacteria. I will consider treating you for urinary tract infection. Triglyceride, tot chol, LDL are up.  You also need to be started on cholesterol treatment

## 2020-04-23 ENCOUNTER — TELEPHONE (OUTPATIENT)
Dept: FAMILY MEDICINE CLINIC | Age: 60
End: 2020-04-23

## 2020-04-23 NOTE — TELEPHONE ENCOUNTER
----- Message from Esperanza Dillon sent at 4/23/2020  9:57 AM EDT -----  Regarding: Dr. Claudeen Dar  Caller: patient    Reason: The patient would like to discuss her lab results.     Best contact number(s): [phone]

## 2020-04-23 NOTE — TELEPHONE ENCOUNTER
----- Message from Ratna Aguilar sent at 4/23/2020  9:31 AM EDT -----  Regarding: Dr. Maisha Guerra  General Message/Vendor Calls    Caller's first and last name:  Nazia Verma, [facility] [address] [employer]      Reason for call:   Drug interaction  \"Cipro\"      Callback required yes/no and why:  Yes, discuss drug interaction for Cipro that was Escribed this morning to [facility]      Best contact number(s):  [phone]      Details to clarify the request:      Ratna Aguilar

## 2020-04-26 DIAGNOSIS — N30.00 ACUTE CYSTITIS WITHOUT HEMATURIA: ICD-10-CM

## 2020-04-26 RX ORDER — NITROFURANTOIN 25; 75 MG/1; MG/1
100 CAPSULE ORAL 2 TIMES DAILY
Qty: 10 CAP | Refills: 0 | Status: SHIPPED | OUTPATIENT
Start: 2020-04-26

## 2020-05-04 ENCOUNTER — TELEPHONE (OUTPATIENT)
Dept: FAMILY MEDICINE CLINIC | Age: 60
End: 2020-05-04

## 2020-05-04 NOTE — TELEPHONE ENCOUNTER
April 23, 2020   Yancy Blair           9:43 AM   Note      ----- Message from Kayla Prajapati sent at 4/23/2020  9:31 AM EDT -----  Regarding: Dr. Flavio Reyes  General Message/Vendor Calls     Caller's first and last name:  Nehemias Lomeli, [facility]        Reason for call:   Drug interaction  \"Cipro\"        Callback required yes/no and why:  Yes, discuss drug interaction for Cipro that was Escribed this morning to [facility]        Best contact number(s):  [phone]        Details to clarify the request:        Kayla Prajapati